# Patient Record
Sex: FEMALE | Race: WHITE | URBAN - METROPOLITAN AREA
[De-identification: names, ages, dates, MRNs, and addresses within clinical notes are randomized per-mention and may not be internally consistent; named-entity substitution may affect disease eponyms.]

---

## 2021-03-08 ENCOUNTER — NEW PATIENT (OUTPATIENT)
Dept: URBAN - METROPOLITAN AREA CLINIC 85 | Facility: CLINIC | Age: 61
End: 2021-03-08
Payer: COMMERCIAL

## 2021-03-08 DIAGNOSIS — Z79.84 LONG TERM (CURRENT) USE OF ORAL ANTIDIABETIC DRUGS: ICD-10-CM

## 2021-03-08 PROCEDURE — 92134 CPTRZ OPH DX IMG PST SGM RTA: CPT | Performed by: OPTOMETRIST

## 2021-03-08 PROCEDURE — 92020 GONIOSCOPY: CPT | Performed by: OPTOMETRIST

## 2021-03-08 PROCEDURE — 92004 COMPRE OPH EXAM NEW PT 1/>: CPT | Performed by: OPTOMETRIST

## 2021-03-08 ASSESSMENT — INTRAOCULAR PRESSURE: OS: 16

## 2021-03-08 ASSESSMENT — KERATOMETRY: OS: 45.38

## 2021-03-08 ASSESSMENT — VISUAL ACUITY: OS: 20/60

## 2021-04-07 ENCOUNTER — OFFICE VISIT (OUTPATIENT)
Dept: URBAN - METROPOLITAN AREA CLINIC 85 | Facility: CLINIC | Age: 61
End: 2021-04-07
Payer: COMMERCIAL

## 2021-04-07 DIAGNOSIS — H35.362 DRUSEN (DEGENERATIVE) OF MACULA, LEFT EYE: ICD-10-CM

## 2021-04-07 PROCEDURE — 92134 CPTRZ OPH DX IMG PST SGM RTA: CPT | Performed by: OPHTHALMOLOGY

## 2021-04-07 PROCEDURE — 92235 FLUORESCEIN ANGRPH MLTIFRAME: CPT | Performed by: OPHTHALMOLOGY

## 2021-04-07 PROCEDURE — 92004 COMPRE OPH EXAM NEW PT 1/>: CPT | Performed by: OPHTHALMOLOGY

## 2021-04-07 PROCEDURE — 92250 FUNDUS PHOTOGRAPHY W/I&R: CPT | Performed by: OPHTHALMOLOGY

## 2021-04-07 ASSESSMENT — INTRAOCULAR PRESSURE: OS: 17

## 2021-04-07 NOTE — IMPRESSION/PLAN
Impression: Nonexudative macular degeneration, early dry stage, left eye: H35.3121. Plan:  Dry AMD on exam -- single PED on OCT, FA today without CNVM (PED appearance) APRIL/dewey  Follow closely - 3m OCT/exam, sooner prn

## 2021-04-07 NOTE — IMPRESSION/PLAN
Impression: Age-related nuclear cataract, left eye: H25.12.  Plan:  Visually significant -- refer for cat eval

## 2021-05-06 ENCOUNTER — OFFICE VISIT (OUTPATIENT)
Dept: URBAN - METROPOLITAN AREA CLINIC 85 | Facility: CLINIC | Age: 61
End: 2021-05-06
Payer: COMMERCIAL

## 2021-05-06 DIAGNOSIS — H25.812 COMBINED FORMS OF AGE-RELATED CATARACT, LEFT EYE: Primary | ICD-10-CM

## 2021-05-06 DIAGNOSIS — H25.12 AGE-RELATED NUCLEAR CATARACT, LEFT EYE: ICD-10-CM

## 2021-05-06 DIAGNOSIS — Z97.0 PRESENCE OF ARTIFICIAL EYE: ICD-10-CM

## 2021-05-06 PROCEDURE — 92136 OPHTHALMIC BIOMETRY: CPT | Performed by: OPHTHALMOLOGY

## 2021-05-06 PROCEDURE — 92014 COMPRE OPH EXAM EST PT 1/>: CPT | Performed by: OPHTHALMOLOGY

## 2021-05-06 ASSESSMENT — INTRAOCULAR PRESSURE: OS: 17

## 2021-05-06 ASSESSMENT — VISUAL ACUITY: OS: 20/60

## 2021-05-06 NOTE — IMPRESSION/PLAN
Impression: Nexdtve age-related mclr degn, left eye, early dry stage: H35.3121. Plan: Macular degeneration, dry type with stable vision. Will continue to monitor vision and the patient has been instructed to call with any vision changes. AG QD recommended.

## 2021-05-06 NOTE — IMPRESSION/PLAN
Impression: Presence of artificial eye: Z97.0. Plan: Discussed monocular precautions and recommend full time spectacle wear for protection.

## 2021-05-06 NOTE — IMPRESSION/PLAN
Impression: Combined forms of age-related cataract, left eye: H25.812. Plan: Cataracts account for the patient's complaints. Discussed all risks, benefits, procedures and recovery. Patient has quality of life issues. Patient understands changing glasses will not improve vision. Patient desires to have surgery, recommend phacoemulsification with intraocular lens. CE w/IOL OS. R/B/A discussed. RL2. Lens: standard   Aim: plano. Dexycu will be used. No drops necessary post operatively.

## 2021-06-02 ENCOUNTER — ADULT PHYSICAL (OUTPATIENT)
Dept: URBAN - METROPOLITAN AREA CLINIC 85 | Facility: CLINIC | Age: 61
End: 2021-06-02
Payer: COMMERCIAL

## 2021-06-02 DIAGNOSIS — Z01.818 ENCOUNTER FOR OTHER PREPROCEDURAL EXAMINATION: Primary | ICD-10-CM

## 2021-06-02 PROCEDURE — 99203 OFFICE O/P NEW LOW 30 MIN: CPT | Performed by: NURSE PRACTITIONER

## 2021-06-02 RX ORDER — TOLTERODINE TARTRATE 2 MG/1
2 MG CAPSULE, EXTENDED RELEASE ORAL
Qty: 0 | Refills: 0 | Status: ACTIVE
Start: 2021-06-02

## 2021-06-09 ENCOUNTER — SURGERY (OUTPATIENT)
Dept: URBAN - METROPOLITAN AREA SURGERY 55 | Facility: SURGERY | Age: 61
End: 2021-06-09
Payer: COMMERCIAL

## 2021-06-09 PROCEDURE — 66984 XCAPSL CTRC RMVL W/O ECP: CPT | Performed by: OPHTHALMOLOGY

## 2021-06-10 ENCOUNTER — POST-OPERATIVE VISIT (OUTPATIENT)
Dept: URBAN - METROPOLITAN AREA CLINIC 85 | Facility: CLINIC | Age: 61
End: 2021-06-10
Payer: COMMERCIAL

## 2021-06-10 DIAGNOSIS — Z48.810 ENCOUNTER FOR SURGICAL AFTERCARE FOLLOWING SURGERY ON A SENSE ORGAN: Primary | ICD-10-CM

## 2021-06-10 PROCEDURE — 99024 POSTOP FOLLOW-UP VISIT: CPT | Performed by: OPHTHALMOLOGY

## 2021-06-10 ASSESSMENT — INTRAOCULAR PRESSURE: OS: 14

## 2021-06-10 NOTE — IMPRESSION/PLAN
Impression: S/P Cataract Extraction by phacoemulsification with IOL placement; ORA OS - 1 Day. Encounter for surgical aftercare following surgery on a sense organ  Z48.810.  Plan: RTC as scheduled or ASAP if pain, decreased vision, or any worsening of condition

## 2021-06-30 ENCOUNTER — POST-OPERATIVE VISIT (OUTPATIENT)
Dept: URBAN - METROPOLITAN AREA CLINIC 85 | Facility: CLINIC | Age: 61
End: 2021-06-30

## 2021-06-30 PROCEDURE — 99024 POSTOP FOLLOW-UP VISIT: CPT | Performed by: OPTOMETRIST

## 2021-06-30 RX ORDER — KETOROLAC TROMETHAMINE 5 MG/ML
0.5 % SOLUTION OPHTHALMIC
Qty: 1 | Refills: 1 | Status: INACTIVE
Start: 2021-06-30 | End: 2022-02-07

## 2021-06-30 ASSESSMENT — VISUAL ACUITY: OS: 20/40+2

## 2021-06-30 ASSESSMENT — INTRAOCULAR PRESSURE: OS: 16

## 2021-06-30 NOTE — IMPRESSION/PLAN
Impression: S/P Cataract Extraction by phacoemulsification with IOL placement; ORA OS - 21 Days. Encounter for surgical aftercare following surgery on a sense organ  Z48.810. Discussed mild persistent inflammation and minimal residual corneal edema. Plan: Initiate ketorolac TID OS and RTC as scheduled with Dr. Stas Menezes on 7-14-21 or ASAP if decreased VA, pain, or any worsening of condition.

## 2021-07-20 ENCOUNTER — OFFICE VISIT (OUTPATIENT)
Dept: URBAN - METROPOLITAN AREA CLINIC 85 | Facility: CLINIC | Age: 61
End: 2021-07-20
Payer: COMMERCIAL

## 2021-07-20 DIAGNOSIS — H35.3121 NONEXUDATIVE AGE-RELATED MACULAR DEGENERATION, LEFT EYE, EARLY DRY STAGE: Primary | ICD-10-CM

## 2021-07-20 PROCEDURE — 92134 CPTRZ OPH DX IMG PST SGM RTA: CPT | Performed by: OPHTHALMOLOGY

## 2021-07-20 PROCEDURE — 92014 COMPRE OPH EXAM EST PT 1/>: CPT | Performed by: OPHTHALMOLOGY

## 2021-07-20 ASSESSMENT — INTRAOCULAR PRESSURE: OS: 11

## 2021-07-23 NOTE — IMPRESSION/PLAN
Impression: Nonexudative macular degeneration, early dry stage, left eye: H35.3121. Plan: Dry AMD on exam -- single PED on OCT, FA without CNVM (PED appearance) APRIL/dewey  Stable , go to 6m OCT/exam, sooner prn

## 2022-01-24 ENCOUNTER — OFFICE VISIT (OUTPATIENT)
Dept: URBAN - METROPOLITAN AREA CLINIC 85 | Facility: CLINIC | Age: 62
End: 2022-01-24
Payer: COMMERCIAL

## 2022-01-24 PROCEDURE — 99214 OFFICE O/P EST MOD 30 MIN: CPT | Performed by: OPHTHALMOLOGY

## 2022-01-24 PROCEDURE — 92134 CPTRZ OPH DX IMG PST SGM RTA: CPT | Performed by: OPHTHALMOLOGY

## 2022-01-24 ASSESSMENT — INTRAOCULAR PRESSURE: OS: 12

## 2022-01-24 NOTE — IMPRESSION/PLAN
Impression: Nonexudative macular degeneration, early dry stage, left eye: H35.9117. Plan: Macular degeneration, dry type with stable vision. Will continue to monitor vision and the patient has been instructed to call with any vision changes. The patient was advised to start taking AREDS/Occuvite and will return to the clinic in 6 month for a dilated follow up and possible OCT. Pt okay to luc[facility] W/ [facility] for new Mrx.

## 2022-02-07 ENCOUNTER — OFFICE VISIT (OUTPATIENT)
Dept: URBAN - METROPOLITAN AREA CLINIC 85 | Facility: CLINIC | Age: 62
End: 2022-02-07
Payer: COMMERCIAL

## 2022-02-07 DIAGNOSIS — E11.9 TYPE 2 DIABETES MELLITUS WITHOUT COMPLICATIONS: Primary | ICD-10-CM

## 2022-02-07 DIAGNOSIS — H52.4 PRESBYOPIA: ICD-10-CM

## 2022-02-07 PROCEDURE — 92012 INTRM OPH EXAM EST PATIENT: CPT | Performed by: OPTOMETRIST

## 2022-02-07 ASSESSMENT — INTRAOCULAR PRESSURE: OS: 11

## 2022-02-07 ASSESSMENT — VISUAL ACUITY
OD: PROS
OS: 20/40

## 2022-02-07 ASSESSMENT — KERATOMETRY: OS: 45.25

## 2022-02-07 NOTE — IMPRESSION/PLAN
Impression: Type 2 diabetes mellitus without complications: V29.2. Plan: No diabetic retinopathy. Recommend yearly diabetic eye exam. Discussed with patient importance of good blood sugar control and compliance with regular visits with PCP, compliance with medications, healthy diet and daily exercise.

## 2023-01-11 ENCOUNTER — OFFICE VISIT (OUTPATIENT)
Dept: URBAN - METROPOLITAN AREA CLINIC 85 | Facility: CLINIC | Age: 63
End: 2023-01-11
Payer: COMMERCIAL

## 2023-01-11 DIAGNOSIS — H35.3121 NONEXUDATIVE AGE-RELATED MACULAR DEGENERATION, LEFT EYE, EARLY DRY STAGE: Primary | ICD-10-CM

## 2023-01-11 DIAGNOSIS — E11.9 TYPE 2 DIABETES MELLITUS WITHOUT COMPLICATIONS: ICD-10-CM

## 2023-01-11 PROCEDURE — 92134 CPTRZ OPH DX IMG PST SGM RTA: CPT | Performed by: OPTOMETRIST

## 2023-01-11 PROCEDURE — 92014 COMPRE OPH EXAM EST PT 1/>: CPT | Performed by: OPTOMETRIST

## 2023-01-11 ASSESSMENT — VISUAL ACUITY: OS: 20/50

## 2023-01-11 ASSESSMENT — INTRAOCULAR PRESSURE: OS: 12

## 2023-01-11 NOTE — IMPRESSION/PLAN
Impression: Nonexudative macular degeneration, early dry stage, left eye: H35.3128. Plan: Macular degeneration, dry type with stable vision. Will continue to monitor vision and the patient has been instructed to call with any vision changes. The patient was advised to start taking AREDS/Occuvite and will return to the clinic in 6 month for a dilated follow up and possible OCT with Dr. Daniel Odom or [facility] ASAP should they experience a decrease in vision, pain, or any worsening of condition.

## 2023-01-11 NOTE — IMPRESSION/PLAN
Impression: Type 2 diabetes mellitus without complications: E26.3. Plan: No diabetic retinopathy. Recommend yearly diabetic eye exam. Discussed with patient importance of good blood sugar control and compliance with regular visits with PCP, compliance with medications, healthy diet and daily exercise.

## 2024-04-17 ENCOUNTER — OFFICE VISIT (OUTPATIENT)
Dept: URBAN - METROPOLITAN AREA CLINIC 85 | Facility: CLINIC | Age: 64
End: 2024-04-17
Payer: COMMERCIAL

## 2024-04-17 DIAGNOSIS — H04.123 DRY EYE SYNDROME OF BILATERAL LACRIMAL GLANDS: ICD-10-CM

## 2024-04-17 DIAGNOSIS — H35.3121 NONEXUDATIVE MACULAR DEGENERATION, EARLY DRY STAGE, LEFT EYE: ICD-10-CM

## 2024-04-17 DIAGNOSIS — E11.9 TYPE 2 DIABETES MELLITUS WITHOUT COMPLICATIONS: Primary | ICD-10-CM

## 2024-04-17 PROCEDURE — 99214 OFFICE O/P EST MOD 30 MIN: CPT | Performed by: OPTOMETRIST

## 2024-04-17 PROCEDURE — 92134 CPTRZ OPH DX IMG PST SGM RTA: CPT | Performed by: OPTOMETRIST

## 2024-04-17 ASSESSMENT — VISUAL ACUITY: OD: PROS

## 2024-04-17 ASSESSMENT — INTRAOCULAR PRESSURE: OS: 12
